# Patient Record
Sex: FEMALE | NOT HISPANIC OR LATINO | Employment: UNEMPLOYED | URBAN - METROPOLITAN AREA
[De-identification: names, ages, dates, MRNs, and addresses within clinical notes are randomized per-mention and may not be internally consistent; named-entity substitution may affect disease eponyms.]

---

## 2018-01-20 ENCOUNTER — ANESTHESIA EVENT (OUTPATIENT)
Dept: SURGERY | Facility: OTHER | Age: 33
End: 2018-01-20
Payer: MEDICAID

## 2018-01-20 ENCOUNTER — HOSPITAL ENCOUNTER (INPATIENT)
Facility: OTHER | Age: 33
LOS: 1 days | Discharge: HOME | End: 2018-01-21
Attending: EMERGENCY MEDICINE | Admitting: OBSTETRICS & GYNECOLOGY
Payer: MEDICAID

## 2018-01-20 ENCOUNTER — SURGERY (OUTPATIENT)
Age: 33
End: 2018-01-20

## 2018-01-20 ENCOUNTER — ANESTHESIA (OUTPATIENT)
Dept: SURGERY | Facility: OTHER | Age: 33
End: 2018-01-20
Payer: MEDICAID

## 2018-01-20 DIAGNOSIS — Z98.890 S/P LAPAROTOMY: ICD-10-CM

## 2018-01-20 DIAGNOSIS — O00.90 RUPTURED ECTOPIC PREGNANCY: Primary | ICD-10-CM

## 2018-01-20 LAB
ABO + RH BLD: NORMAL
ANISOCYTOSIS BLD QL SMEAR: SLIGHT
ANISOCYTOSIS BLD QL SMEAR: SLIGHT
BASOPHILS # BLD AUTO: 0 K/UL
BASOPHILS # BLD AUTO: ABNORMAL K/UL
BASOPHILS NFR BLD: 0 %
BASOPHILS NFR BLD: 0 %
BLD GP AB SCN CELLS X3 SERPL QL: NORMAL
BURR CELLS BLD QL SMEAR: ABNORMAL
DIFFERENTIAL METHOD: ABNORMAL
DIFFERENTIAL METHOD: ABNORMAL
EOSINOPHIL # BLD AUTO: 0 K/UL
EOSINOPHIL # BLD AUTO: ABNORMAL K/UL
EOSINOPHIL NFR BLD: 0 %
EOSINOPHIL NFR BLD: 0 %
ERYTHROCYTE [DISTWIDTH] IN BLOOD BY AUTOMATED COUNT: 12.5 %
ERYTHROCYTE [DISTWIDTH] IN BLOOD BY AUTOMATED COUNT: 15.7 %
HCT VFR BLD AUTO: 18 %
HCT VFR BLD AUTO: 26.8 %
HGB BLD-MCNC: 6.1 G/DL
HGB BLD-MCNC: 9.4 G/DL
LYMPHOCYTES # BLD AUTO: 1.7 K/UL
LYMPHOCYTES # BLD AUTO: ABNORMAL K/UL
LYMPHOCYTES NFR BLD: 4 %
LYMPHOCYTES NFR BLD: 7.6 %
MCH RBC QN AUTO: 29.5 PG
MCH RBC QN AUTO: 32.3 PG
MCHC RBC AUTO-ENTMCNC: 33.9 G/DL
MCHC RBC AUTO-ENTMCNC: 35.1 G/DL
MCV RBC AUTO: 84 FL
MCV RBC AUTO: 95 FL
MONOCYTES # BLD AUTO: 1.4 K/UL
MONOCYTES # BLD AUTO: ABNORMAL K/UL
MONOCYTES NFR BLD: 3 %
MONOCYTES NFR BLD: 6.4 %
NEUTROPHILS # BLD AUTO: 18.9 K/UL
NEUTROPHILS NFR BLD: 86 %
NEUTROPHILS NFR BLD: 88 %
NEUTS BAND NFR BLD MANUAL: 5 %
OVALOCYTES BLD QL SMEAR: ABNORMAL
PLATELET # BLD AUTO: 110 K/UL
PLATELET # BLD AUTO: 296 K/UL
PLATELET BLD QL SMEAR: ABNORMAL
PLATELET BLD QL SMEAR: ABNORMAL
PMV BLD AUTO: 10.1 FL
PMV BLD AUTO: 9.8 FL
POIKILOCYTOSIS BLD QL SMEAR: SLIGHT
POIKILOCYTOSIS BLD QL SMEAR: SLIGHT
POLYCHROMASIA BLD QL SMEAR: ABNORMAL
RBC # BLD AUTO: 1.89 M/UL
RBC # BLD AUTO: 3.19 M/UL
WBC # BLD AUTO: 22.07 K/UL
WBC # BLD AUTO: 29.84 K/UL

## 2018-01-20 PROCEDURE — 37000009 HC ANESTHESIA EA ADD 15 MINS: Performed by: OBSTETRICS & GYNECOLOGY

## 2018-01-20 PROCEDURE — 85025 COMPLETE CBC W/AUTO DIFF WBC: CPT | Mod: 91

## 2018-01-20 PROCEDURE — 27201423 OPTIME MED/SURG SUP & DEVICES STERILE SUPPLY: Performed by: OBSTETRICS & GYNECOLOGY

## 2018-01-20 PROCEDURE — 88302 TISSUE EXAM BY PATHOLOGIST: CPT | Mod: 26,,, | Performed by: PATHOLOGY

## 2018-01-20 PROCEDURE — 99291 CRITICAL CARE FIRST HOUR: CPT | Mod: 25

## 2018-01-20 PROCEDURE — 88305 TISSUE EXAM BY PATHOLOGIST: CPT | Mod: 26,,, | Performed by: PATHOLOGY

## 2018-01-20 PROCEDURE — 85007 BL SMEAR W/DIFF WBC COUNT: CPT

## 2018-01-20 PROCEDURE — 63600175 PHARM REV CODE 636 W HCPCS: Performed by: ANESTHESIOLOGY

## 2018-01-20 PROCEDURE — 63600175 PHARM REV CODE 636 W HCPCS: Performed by: NURSE ANESTHETIST, CERTIFIED REGISTERED

## 2018-01-20 PROCEDURE — 25000003 PHARM REV CODE 250: Performed by: NURSE ANESTHETIST, CERTIFIED REGISTERED

## 2018-01-20 PROCEDURE — 88305 TISSUE EXAM BY PATHOLOGIST: CPT | Performed by: PATHOLOGY

## 2018-01-20 PROCEDURE — 36000709 HC OR TIME LEV III EA ADD 15 MIN: Performed by: OBSTETRICS & GYNECOLOGY

## 2018-01-20 PROCEDURE — P9021 RED BLOOD CELLS UNIT: HCPCS

## 2018-01-20 PROCEDURE — 10T20ZZ RESECTION OF PRODUCTS OF CONCEPTION, ECTOPIC, OPEN APPROACH: ICD-10-PCS | Performed by: OBSTETRICS & GYNECOLOGY

## 2018-01-20 PROCEDURE — 85027 COMPLETE CBC AUTOMATED: CPT

## 2018-01-20 PROCEDURE — P9045 ALBUMIN (HUMAN), 5%, 250 ML: HCPCS | Mod: JG | Performed by: NURSE ANESTHETIST, CERTIFIED REGISTERED

## 2018-01-20 PROCEDURE — 36415 COLL VENOUS BLD VENIPUNCTURE: CPT

## 2018-01-20 PROCEDURE — 86920 COMPATIBILITY TEST SPIN: CPT

## 2018-01-20 PROCEDURE — 99285 EMERGENCY DEPT VISIT HI MDM: CPT

## 2018-01-20 PROCEDURE — 59151 TREAT ECTOPIC PREGNANCY: CPT | Mod: 80,,, | Performed by: OBSTETRICS & GYNECOLOGY

## 2018-01-20 PROCEDURE — 25000003 PHARM REV CODE 250: Performed by: OBSTETRICS & GYNECOLOGY

## 2018-01-20 PROCEDURE — 71000033 HC RECOVERY, INTIAL HOUR: Performed by: OBSTETRICS & GYNECOLOGY

## 2018-01-20 PROCEDURE — 0WJJ0ZZ INSPECTION OF PELVIC CAVITY, OPEN APPROACH: ICD-10-PCS | Performed by: OBSTETRICS & GYNECOLOGY

## 2018-01-20 PROCEDURE — 36000708 HC OR TIME LEV III 1ST 15 MIN: Performed by: OBSTETRICS & GYNECOLOGY

## 2018-01-20 PROCEDURE — 11000001 HC ACUTE MED/SURG PRIVATE ROOM

## 2018-01-20 PROCEDURE — 0UB90ZZ EXCISION OF UTERUS, OPEN APPROACH: ICD-10-PCS | Performed by: OBSTETRICS & GYNECOLOGY

## 2018-01-20 PROCEDURE — 86901 BLOOD TYPING SEROLOGIC RH(D): CPT | Mod: 91

## 2018-01-20 PROCEDURE — 0UT70ZZ RESECTION OF BILATERAL FALLOPIAN TUBES, OPEN APPROACH: ICD-10-PCS | Performed by: OBSTETRICS & GYNECOLOGY

## 2018-01-20 PROCEDURE — 63600175 PHARM REV CODE 636 W HCPCS: Performed by: OBSTETRICS & GYNECOLOGY

## 2018-01-20 PROCEDURE — 37000008 HC ANESTHESIA 1ST 15 MINUTES: Performed by: OBSTETRICS & GYNECOLOGY

## 2018-01-20 PROCEDURE — 71000039 HC RECOVERY, EACH ADD'L HOUR: Performed by: OBSTETRICS & GYNECOLOGY

## 2018-01-20 PROCEDURE — 59151 TREAT ECTOPIC PREGNANCY: CPT | Mod: ,,, | Performed by: OBSTETRICS & GYNECOLOGY

## 2018-01-20 RX ORDER — BISACODYL 10 MG
10 SUPPOSITORY, RECTAL RECTAL DAILY PRN
Status: DISCONTINUED | OUTPATIENT
Start: 2018-01-20 | End: 2018-01-21 | Stop reason: HOSPADM

## 2018-01-20 RX ORDER — ROCURONIUM BROMIDE 10 MG/ML
INJECTION, SOLUTION INTRAVENOUS
Status: DISCONTINUED | OUTPATIENT
Start: 2018-01-20 | End: 2018-01-20

## 2018-01-20 RX ORDER — MIDAZOLAM HYDROCHLORIDE 1 MG/ML
INJECTION INTRAMUSCULAR; INTRAVENOUS
Status: DISCONTINUED | OUTPATIENT
Start: 2018-01-20 | End: 2018-01-20

## 2018-01-20 RX ORDER — DIPHENHYDRAMINE HCL 25 MG
25 CAPSULE ORAL EVERY 4 HOURS PRN
Status: DISCONTINUED | OUTPATIENT
Start: 2018-01-20 | End: 2018-01-21 | Stop reason: HOSPADM

## 2018-01-20 RX ORDER — SODIUM CHLORIDE, SODIUM LACTATE, POTASSIUM CHLORIDE, CALCIUM CHLORIDE 600; 310; 30; 20 MG/100ML; MG/100ML; MG/100ML; MG/100ML
INJECTION, SOLUTION INTRAVENOUS CONTINUOUS
Status: DISCONTINUED | OUTPATIENT
Start: 2018-01-20 | End: 2018-01-21 | Stop reason: HOSPADM

## 2018-01-20 RX ORDER — DIPHENHYDRAMINE HYDROCHLORIDE 50 MG/ML
25 INJECTION INTRAMUSCULAR; INTRAVENOUS EVERY 4 HOURS PRN
Status: DISCONTINUED | OUTPATIENT
Start: 2018-01-20 | End: 2018-01-21 | Stop reason: HOSPADM

## 2018-01-20 RX ORDER — SODIUM CHLORIDE, SODIUM LACTATE, POTASSIUM CHLORIDE, CALCIUM CHLORIDE 600; 310; 30; 20 MG/100ML; MG/100ML; MG/100ML; MG/100ML
INJECTION, SOLUTION INTRAVENOUS CONTINUOUS PRN
Status: DISCONTINUED | OUTPATIENT
Start: 2018-01-20 | End: 2018-01-20

## 2018-01-20 RX ORDER — GLYCOPYRROLATE 0.2 MG/ML
INJECTION INTRAMUSCULAR; INTRAVENOUS
Status: DISCONTINUED | OUTPATIENT
Start: 2018-01-20 | End: 2018-01-20

## 2018-01-20 RX ORDER — HYDROMORPHONE HYDROCHLORIDE 2 MG/ML
0.2 INJECTION, SOLUTION INTRAMUSCULAR; INTRAVENOUS; SUBCUTANEOUS EVERY 5 MIN PRN
Status: DISCONTINUED | OUTPATIENT
Start: 2018-01-20 | End: 2018-01-20 | Stop reason: HOSPADM

## 2018-01-20 RX ORDER — HYDROCODONE BITARTRATE AND ACETAMINOPHEN 500; 5 MG/1; MG/1
TABLET ORAL
Status: DISCONTINUED | OUTPATIENT
Start: 2018-01-20 | End: 2018-01-20

## 2018-01-20 RX ORDER — ONDANSETRON HYDROCHLORIDE 2 MG/ML
INJECTION, SOLUTION INTRAMUSCULAR; INTRAVENOUS
Status: DISCONTINUED | OUTPATIENT
Start: 2018-01-20 | End: 2018-01-20

## 2018-01-20 RX ORDER — SODIUM CHLORIDE 0.9 % (FLUSH) 0.9 %
3 SYRINGE (ML) INJECTION
Status: DISCONTINUED | OUTPATIENT
Start: 2018-01-20 | End: 2018-01-21 | Stop reason: HOSPADM

## 2018-01-20 RX ORDER — BUPIVACAINE HYDROCHLORIDE 2.5 MG/ML
INJECTION, SOLUTION EPIDURAL; INFILTRATION; INTRACAUDAL
Status: DISCONTINUED | OUTPATIENT
Start: 2018-01-20 | End: 2018-01-20 | Stop reason: HOSPADM

## 2018-01-20 RX ORDER — SUCCINYLCHOLINE CHLORIDE 20 MG/ML
INJECTION INTRAMUSCULAR; INTRAVENOUS
Status: DISCONTINUED | OUTPATIENT
Start: 2018-01-20 | End: 2018-01-20

## 2018-01-20 RX ORDER — MEPERIDINE HYDROCHLORIDE 50 MG/ML
12.5 INJECTION INTRAMUSCULAR; INTRAVENOUS; SUBCUTANEOUS ONCE AS NEEDED
Status: COMPLETED | OUTPATIENT
Start: 2018-01-20 | End: 2018-01-20

## 2018-01-20 RX ORDER — ONDANSETRON 2 MG/ML
4 INJECTION INTRAMUSCULAR; INTRAVENOUS EVERY 6 HOURS PRN
Status: DISCONTINUED | OUTPATIENT
Start: 2018-01-20 | End: 2018-01-21 | Stop reason: HOSPADM

## 2018-01-20 RX ORDER — KETOROLAC TROMETHAMINE 30 MG/ML
30 INJECTION, SOLUTION INTRAMUSCULAR; INTRAVENOUS EVERY 6 HOURS
Status: COMPLETED | OUTPATIENT
Start: 2018-01-20 | End: 2018-01-21

## 2018-01-20 RX ORDER — OXYCODONE AND ACETAMINOPHEN 10; 325 MG/1; MG/1
1 TABLET ORAL EVERY 4 HOURS PRN
Status: DISCONTINUED | OUTPATIENT
Start: 2018-01-20 | End: 2018-01-21 | Stop reason: HOSPADM

## 2018-01-20 RX ORDER — PHENYLEPHRINE HYDROCHLORIDE 10 MG/ML
INJECTION INTRAVENOUS
Status: DISCONTINUED | OUTPATIENT
Start: 2018-01-20 | End: 2018-01-20

## 2018-01-20 RX ORDER — OXYCODONE AND ACETAMINOPHEN 5; 325 MG/1; MG/1
1 TABLET ORAL EVERY 4 HOURS PRN
Status: DISCONTINUED | OUTPATIENT
Start: 2018-01-20 | End: 2018-01-21 | Stop reason: HOSPADM

## 2018-01-20 RX ORDER — ALBUMIN HUMAN 50 G/1000ML
SOLUTION INTRAVENOUS CONTINUOUS PRN
Status: DISCONTINUED | OUTPATIENT
Start: 2018-01-20 | End: 2018-01-20

## 2018-01-20 RX ORDER — ONDANSETRON 8 MG/1
8 TABLET, ORALLY DISINTEGRATING ORAL EVERY 8 HOURS PRN
Status: DISCONTINUED | OUTPATIENT
Start: 2018-01-20 | End: 2018-01-21 | Stop reason: HOSPADM

## 2018-01-20 RX ORDER — SODIUM CHLORIDE 9 MG/ML
INJECTION, SOLUTION INTRAVENOUS CONTINUOUS PRN
Status: DISCONTINUED | OUTPATIENT
Start: 2018-01-20 | End: 2018-01-20

## 2018-01-20 RX ORDER — ETOMIDATE 2 MG/ML
INJECTION INTRAVENOUS
Status: DISCONTINUED | OUTPATIENT
Start: 2018-01-20 | End: 2018-01-20

## 2018-01-20 RX ORDER — OXYCODONE HYDROCHLORIDE 5 MG/1
5 TABLET ORAL
Status: DISCONTINUED | OUTPATIENT
Start: 2018-01-20 | End: 2018-01-20 | Stop reason: HOSPADM

## 2018-01-20 RX ORDER — FENTANYL CITRATE 50 UG/ML
INJECTION, SOLUTION INTRAMUSCULAR; INTRAVENOUS
Status: DISCONTINUED | OUTPATIENT
Start: 2018-01-20 | End: 2018-01-20

## 2018-01-20 RX ORDER — HYDROMORPHONE HYDROCHLORIDE 2 MG/ML
0.4 INJECTION, SOLUTION INTRAMUSCULAR; INTRAVENOUS; SUBCUTANEOUS EVERY 5 MIN PRN
Status: DISCONTINUED | OUTPATIENT
Start: 2018-01-20 | End: 2018-01-20 | Stop reason: HOSPADM

## 2018-01-20 RX ORDER — ONDANSETRON 2 MG/ML
4 INJECTION INTRAMUSCULAR; INTRAVENOUS DAILY PRN
Status: DISCONTINUED | OUTPATIENT
Start: 2018-01-20 | End: 2018-01-20 | Stop reason: HOSPADM

## 2018-01-20 RX ORDER — LIDOCAINE HCL/PF 100 MG/5ML
SYRINGE (ML) INTRAVENOUS
Status: DISCONTINUED | OUTPATIENT
Start: 2018-01-20 | End: 2018-01-20

## 2018-01-20 RX ORDER — ACETAMINOPHEN 10 MG/ML
INJECTION, SOLUTION INTRAVENOUS
Status: DISCONTINUED | OUTPATIENT
Start: 2018-01-20 | End: 2018-01-20

## 2018-01-20 RX ORDER — NEOSTIGMINE METHYLSULFATE 1 MG/ML
INJECTION, SOLUTION INTRAVENOUS
Status: DISCONTINUED | OUTPATIENT
Start: 2018-01-20 | End: 2018-01-20

## 2018-01-20 RX ORDER — CEFAZOLIN SODIUM 2 G/50ML
2 SOLUTION INTRAVENOUS
Status: DISCONTINUED | OUTPATIENT
Start: 2018-01-20 | End: 2018-01-20 | Stop reason: HOSPADM

## 2018-01-20 RX ADMIN — FENTANYL CITRATE 50 MCG: 50 INJECTION, SOLUTION INTRAMUSCULAR; INTRAVENOUS at 01:01

## 2018-01-20 RX ADMIN — ACETAMINOPHEN 1000 MG: 10 INJECTION, SOLUTION INTRAVENOUS at 12:01

## 2018-01-20 RX ADMIN — SUCCINYLCHOLINE CHLORIDE 160 MG: 20 INJECTION, SOLUTION INTRAMUSCULAR; INTRAVENOUS at 11:01

## 2018-01-20 RX ADMIN — BUPIVACAINE HYDROCHLORIDE 8 ML: 2.5 INJECTION, SOLUTION EPIDURAL; INFILTRATION; INTRACAUDAL; PERINEURAL at 11:01

## 2018-01-20 RX ADMIN — ONDANSETRON 4 MG: 2 INJECTION, SOLUTION INTRAMUSCULAR; INTRAVENOUS at 12:01

## 2018-01-20 RX ADMIN — KETOROLAC TROMETHAMINE 30 MG: 30 INJECTION, SOLUTION INTRAMUSCULAR at 05:01

## 2018-01-20 RX ADMIN — ALBUMIN (HUMAN): 2.5 SOLUTION INTRAVENOUS at 11:01

## 2018-01-20 RX ADMIN — LIDOCAINE HYDROCHLORIDE 100 MG: 20 INJECTION, SOLUTION INTRAVENOUS at 11:01

## 2018-01-20 RX ADMIN — MIDAZOLAM HYDROCHLORIDE 5 MG: 1 INJECTION, SOLUTION INTRAMUSCULAR; INTRAVENOUS at 11:01

## 2018-01-20 RX ADMIN — SODIUM CHLORIDE: 0.9 INJECTION, SOLUTION INTRAVENOUS at 11:01

## 2018-01-20 RX ADMIN — OXYCODONE HYDROCHLORIDE AND ACETAMINOPHEN 1 TABLET: 10; 325 TABLET ORAL at 07:01

## 2018-01-20 RX ADMIN — ETOMIDATE 20 MG: 2 INJECTION, SOLUTION INTRAVENOUS at 11:01

## 2018-01-20 RX ADMIN — GLYCOPYRROLATE 0.6 MG: 0.2 INJECTION, SOLUTION INTRAMUSCULAR; INTRAVENOUS at 12:01

## 2018-01-20 RX ADMIN — ROCURONIUM BROMIDE 10 MG: 10 INJECTION, SOLUTION INTRAVENOUS at 12:01

## 2018-01-20 RX ADMIN — HYDROMORPHONE HYDROCHLORIDE 0.4 MG: 2 INJECTION, SOLUTION INTRAMUSCULAR; INTRAVENOUS; SUBCUTANEOUS at 01:01

## 2018-01-20 RX ADMIN — MEPERIDINE HYDROCHLORIDE 12.5 MG: 50 INJECTION INTRAMUSCULAR; INTRAVENOUS; SUBCUTANEOUS at 02:01

## 2018-01-20 RX ADMIN — PHENYLEPHRINE HYDROCHLORIDE 100 MCG: 10 INJECTION INTRAVENOUS at 11:01

## 2018-01-20 RX ADMIN — SODIUM CHLORIDE, SODIUM LACTATE, POTASSIUM CHLORIDE, AND CALCIUM CHLORIDE: 600; 310; 30; 20 INJECTION, SOLUTION INTRAVENOUS at 11:01

## 2018-01-20 RX ADMIN — NEOSTIGMINE METHYLSULFATE 4 MG: 1 INJECTION INTRAVENOUS at 12:01

## 2018-01-20 RX ADMIN — CARBOXYMETHYLCELLULOSE SODIUM 2 DROP: 2.5 SOLUTION/ DROPS OPHTHALMIC at 11:01

## 2018-01-20 RX ADMIN — FENTANYL CITRATE 50 MCG: 50 INJECTION, SOLUTION INTRAMUSCULAR; INTRAVENOUS at 12:01

## 2018-01-20 RX ADMIN — SODIUM CHLORIDE, SODIUM LACTATE, POTASSIUM CHLORIDE, AND CALCIUM CHLORIDE: 600; 310; 30; 20 INJECTION, SOLUTION INTRAVENOUS at 05:01

## 2018-01-20 NOTE — ED PROVIDER NOTES
Encounter Date: 2018    SCRIBE #1 NOTE: Sandra HERNANDEZ, can scribing for, and in the presence of, Dr. Christensen.       History     Chief Complaint   Patient presents with    Abdominal Pain     transfer from [facility] by [place] for RLQ abd pain, , approx 8 wk, IUP per US, accepted by MD Ketty     32 y.o. pregnant female, , at approximately 8 weeks gestation who presents to ED from [facility] due to sudden onset of RLQ abdominal pain this morning. She notes accompanying N/V, but no diarrhea, urinary symptoms, chest pain, or SOB. She has had a prior . She has been transferred to this facility to be evaluated by Dr. Mcdonald, OB/GYN.      The history is provided by the patient and medical records.     Review of patient's allergies indicates:  No Known Allergies  Past Medical History:   Diagnosis Date    Arthritis     Scoliosis      Past Surgical History:   Procedure Laterality Date    TUBAL LIGATION Bilateral      History reviewed. No pertinent family history.  Social History   Substance Use Topics    Smoking status: Never Smoker    Smokeless tobacco: Never Used    Alcohol use No     Review of Systems   Constitutional: Negative for chills and fever.   HENT: Negative for congestion, rhinorrhea and sore throat.    Respiratory: Negative for cough and shortness of breath.    Cardiovascular: Negative for chest pain.   Gastrointestinal: Positive for abdominal pain, nausea and vomiting. Negative for constipation and diarrhea.   Endocrine: Negative for polyuria.   Genitourinary: Negative for decreased urine volume and dysuria.   Musculoskeletal: Negative for back pain.   Skin: Negative for rash.   Allergic/Immunologic: Negative for immunocompromised state.   Neurological: Negative for dizziness and weakness.   Hematological: Does not bruise/bleed easily.   Psychiatric/Behavioral: Negative for confusion.       Physical Exam     Initial Vitals [18 0942]   BP Pulse  Resp Temp SpO2   (!) 110/54 (!) 122 18 97.9 °F (36.6 °C) 100 %      MAP       72.67         Physical Exam    Nursing note and vitals reviewed.  Constitutional: She appears well-developed and well-nourished. She is not diaphoretic. She appears ill. No distress.   Uncomfortable appearing.   HENT:   Head: Normocephalic and atraumatic.   Right Ear: External ear normal.   Left Ear: External ear normal.   Moist mucous membranes.   Eyes: Right eye exhibits no discharge. Left eye exhibits no discharge.   No pallor or icterus.   Neck: Normal range of motion. Neck supple.   Cardiovascular: Normal rate, regular rhythm and normal heart sounds. Exam reveals no gallop and no friction rub.    No murmur heard.  Pulmonary/Chest: Breath sounds normal. No respiratory distress. She has no wheezes. She has no rhonchi. She has no rales.   Abdominal: Soft. Bowel sounds are normal. She exhibits distension. There is tenderness. There is guarding. There is no rebound.   Abdomen distended and diffusely tender with voluntary guarding, but no rebound.   Musculoskeletal: Normal range of motion. She exhibits no edema or tenderness.   Lymphadenopathy:     She has no cervical adenopathy.   Neurological: She is alert and oriented to person, place, and time. She has normal strength. No sensory deficit.   Skin: Skin is warm and dry. No rash noted. No erythema.   Psychiatric: She has a normal mood and affect. Her behavior is normal.         ED Course   Critical Care  Date/Time: 1/20/2018 1:56 PM  Performed by: DAVID NEW II  Authorized by: DAVID NEW II   Direct patient critical care time: 10 minutes  Additional history critical care time: 8 minutes  Ordering / reviewing critical care time: 8 minutes  Documentation critical care time: 10 minutes  Consulting other physicians critical care time: 10 minutes  Total critical care time (exclusive of procedural time) : 46 minutes  Critical care was necessary to treat or prevent imminent or  life-threatening deterioration of the following conditions: shock and circulatory failure.        Labs Reviewed   TYPE & SCREEN   TYPE & SCREEN   PREPARE RBC SOFT   PREPARE RBC SOFT              Imaging Results          US OB Less Than 14 Wks with Transvag(xpd (Final result)  Result time 01/20/18 13:06:33    Final result by Belle Sheikh MD (01/20/18 13:06:33)                 Impression:        Imaging findings highly concerning for a ruptured ectopic pregnancy.  This may possibly represent a cornual pregnancy.    These findings were reported to the ordering physician at approximately 1030 hrs. on 1/20/2018.      Electronically signed by: Belle Sheikh MD  Date:     01/20/18  Time:    13:06              Narrative:    Technique: OB ultrasound    Results: The uterus measures 9.5 x 5.6 x 5.6 cm.  No intrauterine pregnancy is seen.  There is an ectopic pregnancy in the right adnexal location possibly renal in location.  The crown rump length that measures 2.4 cm with a heart tone of 140 beats per minute corresponding to a 9 week 1 day gestation.  There is significant amount of complex free fluid seen throughout the abdomen and pelvis highly concerning for a ruptured ectopic pregnancy.                              Medical Decision Making:   Clinical Tests:   Lab Tests: Ordered and Reviewed  Radiological Study: Ordered  ED Management:  9:32 AM - Paged gyn.  9:41 AM - Case discussed with US tech. Have requested stat imaging.  9:56 AM - Reviewed real time US images. Suggests of ectopic pregnancy without IUD.  9:57 AM - Paged gyn again.  10:05 AM - Case discussed directly with Dr. Mcdonald. Will evaluate pt shortly. Plan to go to Or.  10:10 AM - Dr. Mcdonald and residents at bedside reviewing US. Discussing plan of care with pt.  Other:   I have discussed this case with another health care provider.            Scribe Attestation:   Scribe #1: I performed the above scribed service and the documentation accurately describes the  services I performed. I attest to the accuracy of the note.    Attending Attestation:           Physician Attestation for Scribe:  Physician Attestation Statement for Scribe #1: I, Dr. Christensen, reviewed documentation, as scribed by Sandra Simpson in my presence, and it is both accurate and complete.                 ED Course      Patient transferred from outside facility due to large amount of free fluid seen on ultrasound along with a 8-week 6 day gestational age reported IUP.  The patient has a history of tubal ligation.  On exam she is tachycardic has relative hypotension, and a distended significantly tender abdomen.  Appears generally pale and ill.  Clinical suspicion was high for ectopic pregnancy.  Therefore GYN was consulted and stat ultrasound was obtained upon arrival.  This suggests right sided ectopic pregnancy with large amount of intra-abdominal hemorrhage.  We will initiate transfusion, plan to bring patient directly to the OR for operative intervention.  Dr. Mcdonald consulted, at bedside promptly.    Clinical Impression:     1. Ruptured ectopic pregnancy                               Satish Christensen II, MD  01/20/18 [id number]

## 2018-01-20 NOTE — OPERATIVE NOTE ADDENDUM
Certification of Assistant at Surgery       Surgery Date: 1/20/2018     Participating Surgeons:  Surgeon(s) and Role:     * Max Mcdonald MD - Primary    Procedures:  Procedure(s) (LRB):  EXPLORATORY-LAPAROTOMY (N/A)  SALPINGECTOMY (Bilateral)    Assistant Surgeon's Certification of Necessity:  I understand that section 1842 (b) (6) (d) of the Social Security Act generally prohibits Medicare Part B reasonable charge payment for the services of assistants at surgery in teaching hospitals when qualified residents are available to furnish such services. I certify that the services for which payment is claimed were medically necessary, and that no qualified resident was available to perform the services. I further understand that these services are subject to post-payment review by the Medicare carrier.    No Residents were available due to protected academic time today.    Radha Altamirano MD    01/20/2018  1:42 PM

## 2018-01-20 NOTE — ANESTHESIA PREPROCEDURE EVALUATION
01/20/2018  Britta Keen is a 32 y.o., female.    Anesthesia Evaluation    I have reviewed the Patient Summary Reports.    I have reviewed the Nursing Notes.   I have reviewed the Medications.     Review of Systems  Anesthesia Hx:  Denies Family Hx of Anesthesia complications.   Denies Personal Hx of Anesthesia complications.   Social:  Non-Smoker    Hematology/Oncology:     Oncology Normal    -- Anemia:   EENT/Dental:EENT/Dental Normal   Cardiovascular:  Cardiovascular Normal  Tachycardic   Pulmonary:  Pulmonary Normal    Renal/:  Renal/ Normal     Hepatic/GI:  Hepatic/GI Normal    Musculoskeletal:   Arthritis   Spine Disorders:    OB/GYN/PEDS:  Ruptured Ectopic   Neurological:  Neurology Normal    Endocrine:  Endocrine Normal    Dermatological:  Skin Normal    Psych:  Psychiatric Normal           Physical Exam  General:  Well nourished    Airway/Jaw/Neck:  Airway Findings: Mouth Opening: Normal Tongue: Normal  General Airway Assessment: Adult  Mallampati: II  TM Distance: Normal, at least 6 cm      Dental:  Dental Findings: In tact   Chest/Lungs:  Chest/Lungs Findings: Clear to auscultation     Heart/Vascular:  Heart Findings: Rate: Tachycardia  Rhythm: Regular Rhythm     Abdomen:  Abdomen Findings:  Distention, Tenderness       Mental Status:  Mental Status Findings:  Lethargic         Anesthesia Plan  Type of Anesthesia, risks & benefits discussed:  Anesthesia Type:  general  Patient's Preference:   Intra-op Monitoring Plan: standard ASA monitors  Intra-op Monitoring Plan Comments:   Post Op Pain Control Plan: multimodal analgesia  Post Op Pain Control Plan Comments:   Induction:   IV and rapid sequence  Beta Blocker:         Informed Consent: Patient understands risks and agrees with Anesthesia plan.  Questions answered. Anesthesia consent signed with patient.  ASA Score: 2  emergent   Day of  Surgery Review of History & Physical:    H&P update referred to the surgeon.     Anesthesia Plan Notes: Multiple IV's, rapid infuser        Ready For Surgery From Anesthesia Perspective.

## 2018-01-20 NOTE — ED NOTES
Vaginal ultrasound in process at bedside, pelvic set up done. Dr. Christensen at bedside to evaluate the patient. Type and cross and CBC sent to lab and blood bank. Will continue to monitor.

## 2018-01-20 NOTE — NURSING
Pt. received from HALEY Michelle. Settled pt into room 376. VSS. Family at the bedside. ABD dressing CDI. Will continue to monitor.

## 2018-01-20 NOTE — H&P
[facility]  Obstetrics  History & Physical    Patient Name: Britta Keen  MRN: [id number]  Admission Date: 2018  Primary Care Provider: Primary Doctor No    Subjective:     Principal Problem:Ruptured ectopic pregnancy    History of Present Illness: Pt is 32 y.o. with hx of BTL in  who presents to outside ED with c/o abd pain.  bhcg 88k.  Abd distended.    U/s at [facility], [place], operated by [facility] -- confirms ectopic pregnancy, + FHT.  Large amount of blood (up to liver).        Obstetric History       T0      L2     SAB0   TAB0   Ectopic0   Multiple0   Live Births0       # Outcome Date GA Lbr Ck/2nd Weight Sex Delivery Anes PTL Lv   3             2 Para            1 Para                 Past Medical History:   Diagnosis Date    Arthritis     Scoliosis      Past Surgical History:   Procedure Laterality Date    TUBAL LIGATION Bilateral          (Not in a hospital admission)    Review of patient's allergies indicates:  No Known Allergies     Family History     None        Social History Main Topics    Smoking status: Never Smoker    Smokeless tobacco: Never Used    Alcohol use No    Drug use: No    Sexual activity: Not on file     Review of Systems   Objective:     Vital Signs (Most Recent):  Temp: 97.9 °F (36.6 °C) (18 0942)  Pulse: (!) 126 (18 1006)  Resp: 20 (18 0954)  BP: (!) 92/55 (18 1006)  SpO2: 98 % (18 1006) Vital Signs (24h Range):  Temp:  [97.7 °F (36.5 °C)-98.2 °F (36.8 °C)] 97.9 °F (36.6 °C)  Pulse:  [] 126  Resp:  [18-20] 20  SpO2:  [96 %-100 %] 98 %  BP: ()/(46-73) 92/55     Weight: 74.4 kg (164 lb)  Body mass index is 24.22 kg/m².      Physical Exam:   Constitutional: She is oriented to person, place, and time. She appears well-developed. She appears distressed.    HENT:   Head: Normocephalic and atraumatic.    Eyes: Conjunctivae and lids are normal. Pupils are equal, round, and reactive to light.    Neck: Normal range of  motion and full passive range of motion without pain. Neck supple.    Cardiovascular: Normal rate and regular rhythm.  Exam reveals no edema.     Pulmonary/Chest: Effort normal and breath sounds normal. She has no wheezes.        Abdominal: Normal appearance and bowel sounds are normal. She exhibits distension. There is tenderness. There is no rebound and no guarding.             Musculoskeletal: Normal range of motion and moves all extremeties.       Neurological: She is alert and oriented to person, place, and time. She has normal strength.    Skin: Skin is warm. She is diaphoretic.    Psychiatric: She has a normal mood and affect. Her speech is normal and behavior is normal. Thought content normal. Her mood appears not anxious. She does not exhibit a depressed mood. She expresses no suicidal plans and no homicidal plans.     Significant Labs:  Lab Results   Component Value Date    GROUPTRH A POS 2018       I have personallly reviewed all pertinent lab results from the last 24 hours.    Assessment/Plan:     32 y.o. female  at Unknown for:    Active Diagnoses:    Diagnosis Date Noted POA    PRINCIPAL PROBLEM:  Ruptured ectopic pregnancy [O00.90] 2018 Yes      Problems Resolved During this Admission:    Diagnosis Date Noted Date Resolved POA       Ruptured ectopic pregnancy-    Plan  Agree with OR management.  Anesthesia notified.  Will proceed with mini-lap.  R/b/i/a discussed with pt.  All questions answered.      Max Mcdonald MD  Obstetrics  [facility]

## 2018-01-20 NOTE — ANESTHESIA POSTPROCEDURE EVALUATION
"Anesthesia Post Evaluation    Patient: Britta Keen    Procedure(s) Performed: Procedure(s) (LRB):  EXPLORATORY-LAPAROTOMY (N/A)  SALPINGECTOMY (Bilateral)    Final Anesthesia Type: general  Patient location during evaluation: PACU  Patient participation: Yes- Able to Participate  Level of consciousness: awake and alert  Post-procedure vital signs: reviewed and stable  Pain management: adequate  Airway patency: patent  PONV status at discharge: No PONV  Anesthetic complications: no      Cardiovascular status: blood pressure returned to baseline  Respiratory status: unassisted and spontaneous ventilation  Hydration status: euvolemic  Follow-up not needed.        Visit Vitals  /78   Pulse 109   Temp 36.8 °C (98.2 °F) (Oral)   Resp 18   Ht 5' 9" (1.753 m)   Wt 74.4 kg (164 lb)   SpO2 100%   BMI 24.22 kg/m²       Pain/Emmanuel Score: Pain Assessment Performed: Yes (1/20/2018  1:45 PM)  Presence of Pain: complains of pain/discomfort (1/20/2018  1:45 PM)  Pain Rating Prior to Med Admin: 10 (1/20/2018  2:38 AM)  Pain Rating Post Med Admin: 4 (1/20/2018  1:45 PM)  Emmanuel Score: 8 (1/20/2018  1:45 PM)      "

## 2018-01-20 NOTE — TRANSFER OF CARE
"Anesthesia Transfer of Care Note    Patient: Britta Keen    Procedure(s) Performed: Procedure(s) (LRB):  EXPLORATORY-LAPAROTOMY (N/A)  SALPINGECTOMY (Bilateral)    Patient location: PACU    Anesthesia Type: general    Transport from OR: Transported from OR on room air with adequate spontaneous ventilation    Post pain: adequate analgesia    Post assessment: no apparent anesthetic complications    Post vital signs: stable    Level of consciousness: awake and alert    Nausea/Vomiting: no nausea/vomiting    Complications: none    Transfer of care protocol was followed      Last vitals:   Visit Vitals  /61   Pulse (!) 132   Temp 36.8 °C (98.2 °F) (Oral)   Resp 20   Ht 5' 9" (1.753 m)   Wt 74.4 kg (164 lb)   SpO2 100%   BMI 24.22 kg/m²     "

## 2018-01-20 NOTE — PROGRESS NOTES
"Staff:  S:  Pt resting.  A&O x 3.  Pain controlled.  /74   Pulse 88   Temp 98.3 °F (36.8 °C)   Resp 18   Ht 5' 9" (1.753 m)   Wt 74.4 kg (164 lb)   SpO2 99%   BMI 24.22 kg/m²   Exam:  Abd:  Soft, non-distended.  Labs pending  Assessment:  Ruptured right cornual ectopic pregnancy with associated acute blood loss  Plan  1)  Heme -- await post-transfusion H&H.  HD stable with good urine output  2)  Ruptured cornual ectopic pregnancy -- aware of high bhcg and need to follow down to < 1.2 (sukumar since we never saw a clear embryo sac at the time of surgery like they saw on u/s.  It was most likely caught up in the clots and debris removed).    "

## 2018-01-21 VITALS
BODY MASS INDEX: 24.29 KG/M2 | TEMPERATURE: 99 F | SYSTOLIC BLOOD PRESSURE: 150 MMHG | RESPIRATION RATE: 18 BRPM | DIASTOLIC BLOOD PRESSURE: 69 MMHG | HEART RATE: 108 BPM | WEIGHT: 164 LBS | OXYGEN SATURATION: 98 % | HEIGHT: 69 IN

## 2018-01-21 PROBLEM — Z98.890 S/P LAPAROTOMY: Status: ACTIVE | Noted: 2018-01-21

## 2018-01-21 PROCEDURE — 63600175 PHARM REV CODE 636 W HCPCS: Performed by: OBSTETRICS & GYNECOLOGY

## 2018-01-21 PROCEDURE — 99024 POSTOP FOLLOW-UP VISIT: CPT | Mod: ,,, | Performed by: OBSTETRICS & GYNECOLOGY

## 2018-01-21 PROCEDURE — 94761 N-INVAS EAR/PLS OXIMETRY MLT: CPT

## 2018-01-21 PROCEDURE — 25000003 PHARM REV CODE 250: Performed by: OBSTETRICS & GYNECOLOGY

## 2018-01-21 PROCEDURE — 25000003 PHARM REV CODE 250: Performed by: STUDENT IN AN ORGANIZED HEALTH CARE EDUCATION/TRAINING PROGRAM

## 2018-01-21 RX ORDER — IBUPROFEN 600 MG/1
600 TABLET ORAL EVERY 6 HOURS PRN
Qty: 30 TABLET | Refills: 1 | Status: SHIPPED | OUTPATIENT
Start: 2018-01-21 | End: 2018-03-05

## 2018-01-21 RX ORDER — OXYCODONE AND ACETAMINOPHEN 5; 325 MG/1; MG/1
1 TABLET ORAL EVERY 4 HOURS PRN
Qty: 30 TABLET | Refills: 0 | Status: SHIPPED | OUTPATIENT
Start: 2018-01-21 | End: 2018-01-21

## 2018-01-21 RX ORDER — SIMETHICONE 80 MG
80 TABLET,CHEWABLE ORAL 3 TIMES DAILY PRN
Refills: 0 | COMMUNITY
Start: 2018-01-21 | End: 2018-03-05

## 2018-01-21 RX ORDER — SIMETHICONE 80 MG
80 TABLET,CHEWABLE ORAL 3 TIMES DAILY PRN
Refills: 0 | COMMUNITY
Start: 2018-01-21 | End: 2018-01-21

## 2018-01-21 RX ORDER — IBUPROFEN 600 MG/1
600 TABLET ORAL EVERY 6 HOURS PRN
Qty: 30 TABLET | Refills: 1 | Status: SHIPPED | OUTPATIENT
Start: 2018-01-21 | End: 2018-01-21

## 2018-01-21 RX ORDER — ONDANSETRON 8 MG/1
8 TABLET, ORALLY DISINTEGRATING ORAL EVERY 8 HOURS PRN
Qty: 20 TABLET | Refills: 0 | Status: SHIPPED | OUTPATIENT
Start: 2018-01-21 | End: 2018-03-05

## 2018-01-21 RX ORDER — ONDANSETRON 8 MG/1
8 TABLET, ORALLY DISINTEGRATING ORAL EVERY 8 HOURS PRN
Qty: 20 TABLET | Refills: 0 | Status: SHIPPED | OUTPATIENT
Start: 2018-01-21 | End: 2018-01-21

## 2018-01-21 RX ORDER — OXYCODONE AND ACETAMINOPHEN 5; 325 MG/1; MG/1
1 TABLET ORAL EVERY 4 HOURS PRN
Qty: 30 TABLET | Refills: 0 | Status: SHIPPED | OUTPATIENT
Start: 2018-01-21 | End: 2018-03-05

## 2018-01-21 RX ORDER — SIMETHICONE 80 MG
1 TABLET,CHEWABLE ORAL 3 TIMES DAILY PRN
Status: DISCONTINUED | OUTPATIENT
Start: 2018-01-21 | End: 2018-01-21 | Stop reason: HOSPADM

## 2018-01-21 RX ADMIN — SODIUM CHLORIDE, SODIUM LACTATE, POTASSIUM CHLORIDE, AND CALCIUM CHLORIDE: 600; 310; 30; 20 INJECTION, SOLUTION INTRAVENOUS at 12:01

## 2018-01-21 RX ADMIN — OXYCODONE HYDROCHLORIDE AND ACETAMINOPHEN 1 TABLET: 10; 325 TABLET ORAL at 04:01

## 2018-01-21 RX ADMIN — KETOROLAC TROMETHAMINE 30 MG: 30 INJECTION, SOLUTION INTRAMUSCULAR at 12:01

## 2018-01-21 RX ADMIN — SODIUM CHLORIDE, SODIUM LACTATE, POTASSIUM CHLORIDE, AND CALCIUM CHLORIDE: 600; 310; 30; 20 INJECTION, SOLUTION INTRAVENOUS at 06:01

## 2018-01-21 RX ADMIN — SIMETHICONE CHEW TAB 80 MG 80 MG: 80 TABLET ORAL at 04:01

## 2018-01-21 RX ADMIN — KETOROLAC TROMETHAMINE 30 MG: 30 INJECTION, SOLUTION INTRAMUSCULAR at 06:01

## 2018-01-21 NOTE — PLAN OF CARE
Problem: Patient Care Overview  Goal: Plan of Care Review  Outcome: Ongoing (interventions implemented as appropriate)  Patient remains free from injury or falls. Vital signs stable throughout night on room air. Positions self independently. Pain managed with IV and PO medications. Olmedo to gravity with good output. Abd dressing CDI. Pt states feeling much better. No c/o nausea. Slept well. Spouse at bedside. Bed in low locked position and call light within reach. Will continue to monitor.

## 2018-01-21 NOTE — PLAN OF CARE
9:25 AM  Blood culture   Gram + cocci in clusters   called by Carina Interiano in microlab    9:31 AM  Called office notified per .    9:34 AM  Notified Dr Gallardo     9:38 AM  No New Orders RCVD as 1 Bottle +.

## 2018-01-21 NOTE — HOSPITAL COURSE
Patient received 2u pRBCs perioperatively. Postoperative course was uncomplicated, and patient made routine advances as anticipated. On POD#1, patient is meeting all postoperative milestones and is ready for discharge. Pain is well-controlled with PO pain medications. Patient is tolerating PO without nausea or vomiting, ambulating and urinating without difficulty. Patient instructed to continue pain medications, zofran, and simethicone as needed, and to follow up with Dr. Mcdonald in 4-6 weeks for postoperative visit. Also discussed [facility] followup until negative.

## 2018-01-21 NOTE — DISCHARGE INSTRUCTIONS
Incision Care  Remember: Follow-up visits allow your healthcare provider to make sure your incision is healing well. Be sure to keep your appointments.     Stitches (sutures), surgical staples, special strips of surgical tape, or surgical skin glue may be used to close incisions. They also help stop bleeding and speed healing. To help your incision heal, follow the tips on this handout.  Home care  Tips for home care include the following:  · Always wash your hands before touching your incision.  · Keep your incision clean and dry.  · Avoid doing things that could cause dirt or sweat to get on your incision.  · Dont pick at scabs. They help protect the wound.  · Keep your incision out of water.  · Take a sponge bath to avoid getting your incision wet, unless your healthcare provider tells you otherwise.  · Ask your provider when can you take a shower or bathe.  · Ask your provider about the best way to keep your incision dry when bathing or showering.  · Pat stitches dry if they get wet. Dont rub.  · Leave the bandage (dressing) in place until you shower.   · You may leave it open to air after.  · No Lotions, Powders, or Oils.   Care for specific closures  Follow these guidelines unless your healthcare provider tells you otherwise:  · Stitches or staples. Once you no longer need to keep these dry, clean the wound daily. First remove the bandage using clean hands. Then wash the area gently with soap and warm water. Use a wet cotton swab to loosen and remove any blood or crust that forms. After cleaning, put a thin layer of antibiotic ointment on. Then put on a new bandage.  · Skin glue. Dont put liquid, ointment, or cream on your wound while the glue is in place. Avoid activities that cause heavy sweating. Protect the wound from sunlight. Do not scratch, rub, or pick at the glue. Do not put tape directly over the glue. The glue should peel off within 5 to 10 days.  · Surgical tape. Keep the area dry. If it  gets wet, blot the area dry with a clean towel. Surgical tape usually falls off within 7 to 10 days. If it has not fallen off after 10 days, contact your healthcare provider before taking it off yourself. If you are told to remove the tape, put mineral oil or petroleum jelly on a cotton ball. Gently rub the tape until it is removed.  Changing your dressing  Leave the dressing (bandage) in place until you are told to remove it or change it. Follow the instructions below unless told otherwise by your healthcare provider:  · Always wash your hands before changing your dressing.  · After the first 48 hours, the incision wound usually will have closed. At this point, leave the incision uncovered and open to the air. If the incision has not closed keep it covered.  · Cover your incision only if your clothing is rubbing it or causing irritation.  · Change your dressing if it gets wet or soiled.  Follow-up care  Follow up with your healthcare provider to ask how long sutures or staples should be left in place. Be sure to return for stitch or staple removal as directed. If dissolving stitches were used in your mouth, these will not need to be removed. They should fall out or dissolve on their own.  If tape closures were used, remove them yourself when your provider recommends if they have not fallen off on their own. If skin glue was used, the glue will wear off by itself.  When to seek medical care  Call your healthcare provider if you have any of the following:  · More pain, redness, swelling, bleeding, or foul-smelling discharge around the incision area  · Fever of 100.4°F (38ºC) or higher, or as directed by your healthcare provider  · Shaking chills  · Vomiting or nausea that doesn't go away  · Numbness, coldness, or tingling around the incision area, or changes in skin color  · Opening of the sutures or wound  · Stitches or staples come apart or fall out or surgical tape falls off before 7 days or as directed by your  healthcare provider   Date Last Reviewed: 12/1/2016 © 2000-2017 [employer]. [address] [place] [address], [place] [address]. All rights reserved. This information is not intended as a substitute for professional medical care. Always follow your healthcare professional's instructions.            Ibuprofen tablets and capsules  What is this medicine?  IBUPROFEN (eye BYOO proe fen) is a non-steroidal anti-inflammatory drug (NSAID). It is used for dental pain, fever, headaches or migraines, osteoarthritis, rheumatoid arthritis, or painful monthly periods. It can also relieve minor aches and pains caused by a cold, flu, or sore throat.  How should I use this medicine?  Take this medicine by mouth with a glass of water. Follow the directions on the prescription label. Take this medicine with food if your stomach gets upset. Try to not lie down for at least 10 minutes after you take the medicine. Take your medicine at regular intervals. Do not take your medicine more often than directed.  A special MedGuide will be given to you by the pharmacist with each prescription and refill. Be sure to read this information carefully each time.  Talk to your pediatrician regarding the use of this medicine in children. Special care may be needed.  What side effects may I notice from receiving this medicine?  Side effects that you should report to your doctor or health care professional as soon as possible:  · allergic reactions like skin rash, itching or hives, swelling of the face, lips, or tongue  · severe stomach pain  · signs and symptoms of bleeding such as bloody or black, tarry stools; red or dark-brown urine; spitting up blood or brown material that looks like coffee grounds; red spots on the skin; unusual bruising or bleeding from the eye, gums, or nose  · signs and symptoms of a blood clot such as changes in vision; chest pain; severe, sudden headache; trouble speaking; sudden numbness or weakness of the face, arm,  or leg  · unexplained weight gain or swelling  · unusually weak or tired  · yellowing of eyes or skin  Side effects that usually do not require medical attention (report to your doctor or health care professional if they continue or are bothersome):  · bruising  · diarrhea  · dizziness, drowsiness  · headache  · nausea, vomiting  What may interact with this medicine?  Do not take this medicine with any of the following medications:  · cidofovir  · ketorolac  · methotrexate  · pemetrexed  This medicine may also interact with the following medications:  · alcohol  · aspirin  · diuretics  · lithium  · other drugs for inflammation like prednisone  · warfarin  What if I miss a dose?  If you miss a dose, take it as soon as you can. If it is almost time for your next dose, take only that dose. Do not take double or extra doses.  Where should I keep my medicine?  Keep out of the reach of children.  Store at room temperature between 15 and 30 degrees C (59 and 86 degrees F). Keep container tightly closed. Throw away any unused medicine after the expiration date.  What should I tell my health care provider before I take this medicine?  They need to know if you have any of these conditions:  · asthma  · cigarette smoker  · drink more than 3 alcohol containing drinks a day  · heart disease or circulation problems such as heart failure or leg edema (fluid retention)  · high blood pressure  · kidney disease  · liver disease  · stomach bleeding or ulcers  · an unusual or allergic reaction to ibuprofen, aspirin, other NSAIDS, other medicines, foods, dyes, or preservatives  · pregnant or trying to get pregnant  · breast-feeding  What should I watch for while using this medicine?  Tell your doctor or healthcare professional if your symptoms do not start to get better or if they get worse.  This medicine does not prevent heart attack or stroke. In fact, this medicine may increase the chance of a heart attack or stroke. The chance may  increase with longer use of this medicine and in people who have heart disease. If you take aspirin to prevent heart attack or stroke, talk with your doctor or health care professional.  Do not take other medicines that contain aspirin, ibuprofen, or naproxen with this medicine. Side effects such as stomach upset, nausea, or ulcers may be more likely to occur. Many medicines available without a prescription should not be taken with this medicine.  This medicine can cause ulcers and bleeding in the stomach and intestines at any time during treatment. Ulcers and bleeding can happen without warning symptoms and can cause death. To reduce your risk, do not smoke cigarettes or drink alcohol while you are taking this medicine.  You may get drowsy or dizzy. Do not drive, use machinery, or do anything that needs mental alertness until you know how this medicine affects you. Do not stand or sit up quickly, especially if you are an older patient. This reduces the risk of dizzy or fainting spells.  This medicine can cause you to bleed more easily. Try to avoid damage to your teeth and gums when you brush or floss your teeth.  This medicine may be used to treat migraines. If you take migraine medicines for 10 or more days a month, your migraines may get worse. Keep a diary of headache days and medicine use. Contact your healthcare professional if your migraine attacks occur more frequently.  NOTE:This sheet is a summary. It may not cover all possible information. If you have questions about this medicine, talk to your doctor, pharmacist, or health care provider. Copyright© 2017 [employer]      Ondansetron tablets  What is this medicine?  ONDANSETRON (on DAN se gilbert) is used to treat nausea and vomiting caused by chemotherapy. It is also used to prevent or treat nausea and vomiting after surgery.  How should I use this medicine?  Take this medicine by mouth with a glass of water. Follow the directions on your prescription  label. Take your doses at regular intervals. Do not take your medicine more often than directed.  Talk to your pediatrician regarding the use of this medicine in children. Special care may be needed.  What side effects may I notice from receiving this medicine?  Side effects that you should report to your doctor or health care professional as soon as possible:  · allergic reactions like skin rash, itching or hives, swelling of the face, lips or tongue  · breathing problems  · confusion  · dizziness  · fast or irregular heartbeat  · feeling faint or lightheaded, falls  · fever and chills  · loss of balance or coordination  · seizures  · sweating  · swelling of the hands or feet  · tightness in the chest  · tremors  · unusually weak or tired  Side effects that usually do not require medical attention (report to your doctor or health care professional if they continue or are bothersome):  · constipation or diarrhea  · headache  What may interact with this medicine?  Do not take this medicine with any of the following medications:  · apomorphine  · certain medicines for fungal infections like fluconazole, itraconazole, ketoconazole, posaconazole, voriconazole  · cisapride  · dofetilide  · dronedarone  · pimozide  · thioridazine  · ziprasidone  This medicine may also interact with the following medications:  · carbamazepine  · certain medicines for depression, anxiety, or psychotic disturbances  · fentanyl  · linezolid  · MAOIs like Carbex, Eldepryl, Marplan, Nardil, and Parnate  · methylene blue (injected into a vein)  · other medicines that prolong the QT interval (cause an abnormal heart rhythm)  · phenytoin  · rifampicin  · tramadol  What if I miss a dose?  If you miss a dose, take it as soon as you can. If it is almost time for your next dose, take only that dose. Do not take double or extra doses.  Where should I keep my medicine?  Keep out of the reach of children.  Store between 2 and 30 degrees C (36 and 86  degrees F). Throw away any unused medicine after the expiration date.  What should I tell my health care provider before I take this medicine?  They need to know if you have any of these conditions:  · heart disease  · history of irregular heartbeat  · liver disease  · low levels of magnesium or potassium in the blood  · an unusual or allergic reaction to ondansetron, granisetron, other medicines, foods, dyes, or preservatives  · pregnant or trying to get pregnant  · breast-feeding  What should I watch for while using this medicine?  Check with your doctor or health care professional right away if you have any sign of an allergic reaction.  NOTE:This sheet is a summary. It may not cover all possible information. If you have questions about this medicine, talk to your doctor, pharmacist, or health care provider. Copyright© 2017 [employer]        Acetaminophen; Oxycodone tablets  What is this medicine?  ACETAMINOPHEN; OXYCODONE (a set a EDIN aureliano fen; ox i KOE done) is a pain reliever. It is used to treat moderate to severe pain.  How should I use this medicine?  Take this medicine by mouth with a full glass of water. Follow the directions on the prescription label. You can take it with or without food. If it upsets your stomach, take it with food. Take your medicine at regular intervals. Do not take it more often than directed.  A special MedGuide will be given to you by the pharmacist with each prescription and refill. Be sure to read this information carefully each time.  Talk to your pediatrician regarding the use of this medicine in children. Special care may be needed.  What side effects may I notice from receiving this medicine?  Side effects that you should report to your doctor or health care professional as soon as possible:  · allergic reactions like skin rash, itching or hives, swelling of the face, lips, or tongue  · breathing problems  · confusion  · redness, blistering, peeling or loosening of the  skin, including inside the mouth  · signs and symptoms of liver injury like dark yellow or brown urine; general ill feeling or flu-like symptoms; light-colored stools; loss of appetite; nausea; right upper belly pain; unusually weak or tired; yellowing of the eyes or skin  · signs and symptoms of low blood pressure like dizziness; feeling faint or lightheaded, falls; unusually weak or tired  · trouble passing urine or change in the amount of urine  Side effects that usually do not require medical attention (report to your doctor or health care professional if they continue or are bothersome):  · constipation  · dry mouth  · nausea, vomiting  · tiredness  What may interact with this medicine?  This medicine may interact with the following medications:  · alcohol  · antihistamines for allergy, cough and cold  · antiviral medicines for HIV or AIDS  · atropine  · certain antibiotics like clarithromycin, erythromycin, linezolid, rifampin  · certain medicines for anxiety or sleep  · certain medicines for bladder problems like oxybutynin, tolterodine  · certain medicines for depression like amitriptyline, fluoxetine, sertraline  · certain medicines for fungal infections like ketoconazole, itraconazole, voriconazole  · certain medicines for migraine headache like almotriptan, eletriptan, frovatriptan, naratriptan, rizatriptan, sumatriptan, zolmitriptan  · certain medicines for nausea or vomiting like dolasetron, ondansetron, palonosetron  · certain medicines for Parkinson's disease like benztropine, trihexyphenidyl  · certain medicines for seizures like phenobarbital, phenytoin, primidone  · certain medicines for stomach problems like dicyclomine, hyoscyamine  · certain medicines for travel sickness like scopolamine  · diuretics  · general anesthetics like halothane, isoflurane, methoxyflurane, propofol  · ipratropium  · local anesthetics like lidocaine, pramoxine, tetracaine  · MAOIs like Carbex, Eldepryl, Marplan, Nardil,  and Parnate  · medicines that relax muscles for surgery  · methylene blue  · nilotinib  · other medicines with acetaminophen  · other narcotic medicines for pain or cough  · phenothiazines like chlorpromazine, mesoridazine, prochlorperazine, thioridazine  What if I miss a dose?  If you miss a dose, take it as soon as you can. If it is almost time for your next dose, take only that dose. Do not take double or extra doses.  Where should I keep my medicine?  Keep out of the reach of children. This medicine can be abused. Keep your medicine in a safe place to protect it from theft. Do not share this medicine with anyone. Selling or giving away this medicine is dangerous and against the law.  This medicine may cause accidental overdose and death if it taken by other adults, children, or pets. Mix any unused medicine with a substance like cat litter or coffee grounds. Then throw the medicine away in a sealed container like a sealed bag or a coffee can with a lid. Do not use the medicine after the expiration date.  Store at room temperature between 20 and 25 degrees C (68 and 77 degrees F).  What should I tell my health care provider before I take this medicine?  They need to know if you have any of these conditions:  · brain tumor  · Crohn's disease, inflammatory bowel disease, or ulcerative colitis  · drug abuse or addiction  · head injury  · heart or circulation problems  · if you often drink alcohol  · kidney disease or problems going to the bathroom  · liver disease  · lung disease, asthma, or breathing problems  · an unusual or allergic reaction to acetaminophen, oxycodone, other opioid analgesics, other medicines, foods, dyes, or preservatives  · pregnant or trying to get pregnant  · breast-feeding  What should I watch for while using this medicine?  Tell your doctor or health care professional if your pain does not go away, if it gets worse, or if you have new or a different type of pain. You may develop tolerance  to the medicine. Tolerance means that you will need a higher dose of the medication for pain relief. Tolerance is normal and is expected if you take this medicine for a long time.  Do not suddenly stop taking your medicine because you may develop a severe reaction. Your body becomes used to the medicine. This does NOT mean you are addicted. Addiction is a behavior related to getting and using a drug for a non-medical reason. If you have pain, you have a medical reason to take pain medicine. Your doctor will tell you how much medicine to take. If your doctor wants you to stop the medicine, the dose will be slowly lowered over time to avoid any side effects.  There are different types of narcotic medicines (opiates). If you take more than one type at the same time or if you are taking another medicine that also causes drowsiness, you may have more side effects. Give your health care provider a list of all medicines you use. Your doctor will tell you how much medicine to take. Do not take more medicine than directed. Call emergency for help if you have problems breathing or unusual sleepiness.  Do not take other medicines that contain acetaminophen with this medicine. Always read labels carefully. If you have questions, ask your doctor or pharmacist.  If you take too much acetaminophen get medical help right away. Too much acetaminophen can be very dangerous and cause liver damage. Even if you do not have symptoms, it is important to get help right away.  You may get drowsy or dizzy. Do not drive, use machinery, or do anything that needs mental alertness until you know how this medicine affects you. Do not stand or sit up quickly, especially if you are an older patient. This reduces the risk of dizzy or fainting spells. Alcohol may interfere with the effect of this medicine. Avoid alcoholic drinks.  The medicine will cause constipation. Try to have a bowel movement at least every 2 to 3 days. If you do not have a bowel  movement for 3 days, call your doctor or health care professional.  Your mouth may get dry. Chewing sugarless gum or sucking hard candy, and drinking plenty or water may help. Contact your doctor if the problem does not go away or is severe.  NOTE:This sheet is a summary. It may not cover all possible information. If you have questions about this medicine, talk to your doctor, pharmacist, or health care provider. Copyright© 2017 [employer]      Simethicone chewable tablets  What is this medicine?  SIMETHICONE (sye METH i sarbjit) is used to decrease the discomfort caused by gas.  How should I use this medicine?  Take this medicine by mouth. Crush or chew the tablets. Do not swallow them whole. Follow the directions on the label or those given to you by your doctor or health care professional. Do not take your medicine more often than directed.  Talk to your pediatrician regarding the use of this medicine in children. While this medicine may be used in children as young as 12 years for selected conditions, precautions do apply.  What side effects may I notice from receiving this medicine?  There are no reported side effects of this medicine.  What may interact with this medicine?  Interactions are not expected.  What if I miss a dose?  This does not apply. You will only use this medicine as needed for gas pain. Do not use double or extra doses.  Where should I keep my medicine?  Keep out of the reach of children.  Store at room temperature between 15 and 30 degrees C (59 and 86 degrees F). Keep container tightly closed. Throw away any unused medicine after the expiration date.  What should I tell my health care provider before I take this medicine?  They need to know if you have any of these conditions:  · phenylketonuria  · an unusual or allergic reaction to simethicone, other medicines, foods, dyes, or preservatives  · pregnant or trying to get pregnant  · breast-feeding  What should I watch for while using this  medicine?  Tell your doctor or health care professional if your symptoms get worse, or if you have severe pain, diarrhea, constipation, or blood in your stool. These could be signs of a more serious condition.  NOTE:This sheet is a summary. It may not cover all possible information. If you have questions about this medicine, talk to your doctor, pharmacist, or health care provider. Copyright© 2017 [employer]

## 2018-01-21 NOTE — SUBJECTIVE & OBJECTIVE
Interval History: POD#1. No acute issues. Pain is adequately controlled with PO medications and NSAIDs. Tolerating regular diet without nausea or vomiting. Has ambulated and voided. Denies flatus or BM. No other complaints at this time.      Scheduled Meds:  Continuous Infusions:   lactated ringers 125 mL/hr at 01/21/18 0632     PRN Meds:bisacodyl, diphenhydrAMINE, diphenhydrAMINE, ondansetron, ondansetron, oxyCODONE-acetaminophen, oxyCODONE-acetaminophen, promethazine (PHENERGAN) IVPB, simethicone, sodium chloride 0.9%    Review of patient's allergies indicates:  No Known Allergies    Objective:     Vital Signs (Most Recent):  Temp: 99.3 °F (37.4 °C) (01/21/18 1618)  Pulse: 108 (01/21/18 1618)  Resp: 18 (01/21/18 1618)  BP: (!) 150/69 (01/21/18 1618)  SpO2: 98 % (01/21/18 1618) Vital Signs (24h Range):  Temp:  [98.3 °F (36.8 °C)-99.4 °F (37.4 °C)] 99.3 °F (37.4 °C)  Pulse:  [] 108  Resp:  [16-18] 18  SpO2:  [98 %-100 %] 98 %  BP: (114-150)/(55-71) 150/69     Weight: 74.4 kg (164 lb)  Body mass index is 24.22 kg/m².  No LMP recorded (lmp unknown). Patient is not currently having periods (Reason: Other).    I&O (Last 24H):  Intake/Output - Last 3 Shifts       01/19 0700 - 01/20 0659 01/20 0700 - 01/21 0659 01/21 0700 - 01/22 0659    P.O.  600 480    I.V. (mL/kg)  3360.4 (45.2) 781.3 (10.5)    Blood  1020.8     Other   0    Total Intake(mL/kg)  4981.3 (67) 1261.3 (17)    Urine (mL/kg/hr)  3650 900 (1.3)    Blood  1700     Total Output   5350 900    Net   -368.8 +361.3               Laboratory:    Recent Labs  Lab 01/20/18  0216 01/20/18  0959 01/20/18  1734   WBC 17.70* 29.84* 22.07*   HGB 11.1* 6.1* 9.4*   HCT 33.7* 18.0* 26.8*   MCV 95 95 84    296 110*      Physical Exam:   Constitutional: She is oriented to person, place, and time. She appears well-developed and well-nourished. No distress.       Cardiovascular: Normal rate and regular rhythm.     Pulmonary/Chest: Effort normal. No respiratory  distress.        Abdominal: Soft. Bowel sounds are normal. She exhibits distension (mild) and abdominal incision (dressing in place, c/d/i). There is tenderness (appropriate). There is no rebound and no guarding.             Musculoskeletal: Moves all extremeties. She exhibits no edema.   TEDs/SCDs in place       Neurological: She is alert and oriented to person, place, and time.    Skin: Skin is warm and dry.    Psychiatric: She has a normal mood and affect. Her behavior is normal.

## 2018-01-21 NOTE — ASSESSMENT & PLAN NOTE
- indication for surgery  - needs [facility] until negative

## 2018-01-21 NOTE — HPI
31 yo  admitted for management of ruptured cornual ectopic. She was unstable on admission, taken immediately to surgery. See op note for details.

## 2018-01-21 NOTE — PROGRESS NOTES
[facility]  Obstetrics & Gynecology  Progress Note    Patient Name: Britta Keen  MRN: [id number]  Admission Date: 2018  Primary Care Provider: Primary Doctor No  Principal Problem: Ruptured ectopic pregnancy    Subjective:     HPI:  33 yo  admitted for management of ruptured cornual ectopic. She was unstable on admission, taken immediately to surgery. See op note for details.    Interval History: POD#1. No acute issues. Pain is adequately controlled with PO medications and NSAIDs. Tolerating regular diet without nausea or vomiting. Has ambulated and voided. Denies flatus or BM. No other complaints at this time.      Scheduled Meds:  Continuous Infusions:   lactated ringers 125 mL/hr at 18 0632     PRN Meds:bisacodyl, diphenhydrAMINE, diphenhydrAMINE, ondansetron, ondansetron, oxyCODONE-acetaminophen, oxyCODONE-acetaminophen, promethazine (PHENERGAN) IVPB, simethicone, sodium chloride 0.9%    Review of patient's allergies indicates:  No Known Allergies    Objective:     Vital Signs (Most Recent):  Temp: 99.3 °F (37.4 °C) (18)  Pulse: 108 (18)  Resp: 18 (18)  BP: (!) 150/69 (18)  SpO2: 98 % (18) Vital Signs (24h Range):  Temp:  [98.3 °F (36.8 °C)-99.4 °F (37.4 °C)] 99.3 °F (37.4 °C)  Pulse:  [] 108  Resp:  [16-18] 18  SpO2:  [98 %-100 %] 98 %  BP: (114-150)/(55-71) 150/69     Weight: 74.4 kg (164 lb)  Body mass index is 24.22 kg/m².  No LMP recorded (lmp unknown). Patient is not currently having periods (Reason: Other).    I&O (Last 24H):  Intake/Output - Last 3 Shifts       700 -  0659 700 -  06 07 -  0659    P.O.  600 480    I.V. (mL/kg)  3360.4 (45.2) 781.3 (10.5)    Blood  1020.8     Other   0    Total Intake(mL/kg)  4981.3 (67) 1261.3 (17)    Urine (mL/kg/hr)  3650 900 (1.3)    Blood  1700     Total Output   5350 900    Net   -368.8 +361.3               Laboratory:    Recent  Labs  Lab 01/20/18  0216 01/20/18  0959 01/20/18  1734   WBC 17.70* 29.84* 22.07*   HGB 11.1* 6.1* 9.4*   HCT 33.7* 18.0* 26.8*   MCV 95 95 84    296 110*      Physical Exam:   Constitutional: She is oriented to person, place, and time. She appears well-developed and well-nourished. No distress.       Cardiovascular: Normal rate and regular rhythm.     Pulmonary/Chest: Effort normal. No respiratory distress.        Abdominal: Soft. Bowel sounds are normal. She exhibits distension (mild) and abdominal incision (dressing in place, c/d/i). There is tenderness (appropriate). There is no rebound and no guarding.             Musculoskeletal: Moves all extremeties. She exhibits no edema.   TEDs/SCDs in place       Neurological: She is alert and oriented to person, place, and time.    Skin: Skin is warm and dry.    Psychiatric: She has a normal mood and affect. Her behavior is normal.       Assessment/Plan:     * Ruptured ectopic pregnancy    - indication for surgery  - [place] [facility] [place]        S/P laparotomy    Postoperative care  - Pain control with percocet prn  - Regular diet  - Zofran/phenergan prn  - Voiding spontaneosuly  - Simethicone prn.  - Encourage ambulation and IS use  - TEDs/SCDs for DVT ppx  - CBC 6/18 >2u pRBCs perioperatively>9/27. Asymptomatic, VSS.            Nina Estes MD  Obstetrics & Gynecology  [facility]

## 2018-01-21 NOTE — DISCHARGE SUMMARY
[facility]  Obstetrics & Gynecology  Discharge Summary    Patient Name: Britta Keen  MRN: [id number]  Admission Date: 2018  Hospital Length of Stay: 1 days  Discharge Date and Time:  2018 4:24 PM  Attending Physician: Max Mcdonald MD   Discharging Provider: Nina Estes MD  Primary Care Provider: Primary Doctor No    HPI:  33 yo  admitted for management of ruptured cornual ectopic. She was unstable on admission, taken immediately to surgery. See op note for details.    Hospital Course:  Patient received 2u pRBCs perioperatively. Postoperative course was uncomplicated, and patient made routine advances as anticipated. On POD#1, patient is meeting all postoperative milestones and is ready for discharge. Pain is well-controlled with PO pain medications. Patient is tolerating PO without nausea or vomiting, ambulating and urinating without difficulty. Patient instructed to continue pain medications, zofran, and simethicone as needed, and to follow up with Dr. Mcdonald in 4-6 weeks for postoperative visit. Also discussed [facility] [place] [place] follow[place] until negative.    Procedure(s) (LRB):  EXPLORATORY-LAPAROTOMY (N/A)  SALPINGECTOMY (Bilateral)         Significant Diagnostic Studies: Labs:   CBC   Recent Labs  Lab 18  0216 18  0959 18  1734   WBC 17.70* 29.84* 22.07*   HGB 11.1* 6.1* 9.4*   HCT 33.7* 18.0* 26.8*    296 110*       Pending Diagnostic Studies:     None        Final Active Diagnoses:    Diagnosis Date Noted POA    PRINCIPAL PROBLEM:  Ruptured ectopic pregnancy [O00.90] 2018 Yes    S/P laparotomy [Z98.890] 2018 Not Applicable      Problems Resolved During this Admission:    Diagnosis Date Noted Date Resolved POA        Discharged Condition: good    Disposition: Home or Self Care    Follow Up:  Follow-up Information     Schedule an appointment as soon as possible for a visit with Max Mcdonald MD.    Specialties:  Obstetrics, Obstetrics and  Gynecology  Why:  follow up visit in 4-6 weeks  Contact information:  Sukhjinder SEGOVIA [address] [address]  [phone]                 Patient Instructions:     hCG, quantitative   Standing Status: Standing Number of Occurrences: 12 Standing Exp. Date: 03/22/19     Activity as tolerated     Notify your health care provider if you experience any of the following:  temperature >100.4     Notify your health care provider if you experience any of the following:  persistent nausea and vomiting or diarrhea     Notify your health care provider if you experience any of the following:  severe uncontrolled pain     Notify your health care provider if you experience any of the following:  redness, tenderness, or signs of infection (pain, swelling, redness, odor or green/yellow discharge around incision site)     Notify your health care provider if you experience any of the following:  difficulty breathing or increased cough     Notify your health care provider if you experience any of the following:  severe persistent headache     Notify your health care provider if you experience any of the following:  persistent dizziness, light-headedness, or visual disturbances     No dressing needed   Order Comments: Wash incision with soap/water daily, dry completely.       Medications:  Reconciled Home Medications:   Current Discharge Medication List      START taking these medications    Details   ibuprofen (ADVIL,MOTRIN) 600 MG tablet Take 1 tablet (600 mg total) by mouth every 6 (six) hours as needed for Pain (cramping).  Qty: 30 tablet, Refills: 1    Associated Diagnoses: S/P laparotomy      ondansetron (ZOFRAN-ODT) 8 MG TbDL Take 1 tablet (8 mg total) by mouth every 8 (eight) hours as needed (nausea, vomiting).  Qty: 20 tablet, Refills: 0    Associated Diagnoses: S/P laparotomy      oxyCODONE-acetaminophen (PERCOCET) 5-325 mg per tablet Take 1 tablet by mouth every 4 (four) hours as needed for Pain.  Qty: 30 tablet,  Refills: 0    Associated Diagnoses: S/P laparotomy      simethicone (MYLICON) 80 MG chewable tablet Take 1 tablet (80 mg total) by mouth 3 (three) times daily as needed for Flatulence.  Refills: 0    Associated Diagnoses: S/P laparotomy             Nina Estes MD  Obstetrics & Gynecology  [facility]

## 2018-01-21 NOTE — ASSESSMENT & PLAN NOTE
Postoperative care  - Pain control with percocet prn  - Regular diet  - Zofran/phenergan prn  - Voiding spontaneosuly  - Simethicone prn.  - Encourage ambulation and IS use  - TEDs/SCDs for DVT ppx  - CBC 6/18 >2u pRBCs perioperatively>9/27. Asymptomatic, VSS.

## 2018-01-21 NOTE — PLAN OF CARE
5:03 PM   In agreement and eager for DC.  Tolerating good PO and ambulating well.  Operative dressing CDI and no vaginal bleeding to peripad.  Voiding with good UOP.     VU of DC instructions; paperwork and prescriptions passed. IVs removed with cath tip intact, WNL.      To be DCd home with family.  Will be escorted downstairs via  transport team once dressed and ready.  Free from falls or skin breakdown this hospital admission.

## 2018-01-21 NOTE — OP NOTE
DATE OF PROCEDURE:  01/20/2018    PREOPERATIVE DIAGNOSIS:  Ruptured ectopic pregnancy.    POSTOPERATIVE DIAGNOSIS:  Ruptured ectopic pregnancy.    PROCEDURES PERFORMED:  1.  Exploratory laparotomy.  2.  Wedge resection of the right cornual area.  3.  Bilateral salpingectomy.    PRIMARY SURGEON:  Max Mcdonald M.D.    PRIMARY ASSISTANT:  Dr. Radha Israel (No residents were available).    ANESTHESIA:  General endotracheal anesthesia.    ESTIMATED BLOOD LOSS:  Approximately 50 mL Intraoperatively, 1700 mL   hemoperitoneum, 400 mL of clots.    FINDINGS:  The patient had nearly two liters of blood and clots in her abdomen   with a ruptured right cornual ectopic.  The patient had otherwise   normal-appearing uterus and ovaries.    STATEMENT OF MEDICAL NECESSITY:  The patient is a 32-year-old female who had a   history of a tubal ligation in 2011, who presented with acute onset of abdominal   pain and obstipation.  The patient was found to have a ruptured ectopic   pregnancy on ultrasound with Beta of 88,000.  After discussing the risks,   benefits, indications and alternatives, the plan was to proceed with the   above-stated procedure.    PROCEDURE IN DETAIL:  After informed consent was obtained, the patient was taken   to the Operating Room, at which time general anesthesia was administered.  The   patient was prepped and draped in the usual sterile fashion.  A 7 cm skin   incision was made approximately 2 fingerbreadths above the pubic symphysis.  The   skin incision was carried down to the fascia and nicked in the midline.  The   incision was extended laterally bilaterally.  Two [facility] clamps were used to grasp the fascia, which were dissected off the   underlying rectus muscles.  The peritoneum was identified and grasped with 2   hemostats.  Metzenbaums were used to sharply entered the peritoneal cavity.    Upon entry copious amounts of blood and clots were removed.  Next, the Florencio   retractor was placed into the abdominal  cavity.  The uterus was grasped and   delivered through the incision.  It was ruptured right cornual ectopic was   identified.  A wedge resection was performed using the Bovie cautery.  The   cornual defect was repaired in a 2-layer fashion using a 0 Vicryl suture.    Attention was taken to the fallopian tubes, which were both removed and suture   ligated using a 0 Vicryl suture.  Copious amount of irrigation was used to clear   all clots and debris.  Multiple attempts were made to try to obtain any   products of conception.  The products were never visualized.  Amy was placed   on the cornual incision.  The Florencio retractor was removed.  A figure-of-eight   suture was placed in the rectus muscles to close the diastasis.  The fascia was   then closed in a running nonlocking fashion using a PDS suture.  The   subcutaneous tissues were made hemostatic using Bovie cautery.  The skin was   closed using a 4-0 Monocryl suture.  The patient was then extubated and taken to   Recovery Room in stable condition.  All laps, needles, and sponge counts were   correct x2 at the conclusion of the case.  The patient did receive preoperative   antibiotics.      ROBERT/IN  dd: 01/20/2018 13:51:09 (CST)  td: 01/20/2018 21:04:27 (CST)  Doc ID   #[id number]  Job ID #[id number]    CC:

## 2018-01-24 LAB
BLD PROD TYP BPU: NORMAL
BLOOD UNIT EXPIRATION DATE: NORMAL
BLOOD UNIT TYPE CODE: 6200
BLOOD UNIT TYPE: NORMAL
CODING SYSTEM: NORMAL
DISPENSE STATUS: NORMAL
TRANS ERYTHROCYTES VOL PATIENT: NORMAL ML

## 2018-01-25 ENCOUNTER — TELEPHONE (OUTPATIENT)
Dept: OBSTETRICS AND GYNECOLOGY | Facility: CLINIC | Age: 33
End: 2018-01-25

## 2018-01-25 DIAGNOSIS — O00.90 RUPTURED ECTOPIC PREGNANCY: Primary | ICD-10-CM

## 2018-01-25 NOTE — TELEPHONE ENCOUNTER
Pt needs weekly bhcg due to ruptured ectopic pregnancy (can be on Friday).  Please call to coordinate.  thanks

## 2018-01-26 ENCOUNTER — LAB VISIT (OUTPATIENT)
Dept: LAB | Facility: OTHER | Age: 33
End: 2018-01-26
Attending: OBSTETRICS & GYNECOLOGY
Payer: MEDICAID

## 2018-01-26 DIAGNOSIS — O00.90 RUPTURED ECTOPIC PREGNANCY: ICD-10-CM

## 2018-01-26 LAB — HCG INTACT+B SERPL-ACNC: 893 MIU/ML

## 2018-01-26 PROCEDURE — 84702 CHORIONIC GONADOTROPIN TEST: CPT

## 2018-01-26 PROCEDURE — 36415 COLL VENOUS BLD VENIPUNCTURE: CPT

## 2018-02-01 ENCOUNTER — TELEPHONE (OUTPATIENT)
Dept: OBSTETRICS AND GYNECOLOGY | Facility: CLINIC | Age: 33
End: 2018-02-01

## 2018-02-01 DIAGNOSIS — O00.90 RUPTURED ECTOPIC PREGNANCY: Primary | ICD-10-CM

## 2018-02-01 DIAGNOSIS — Z98.890 S/P LAPAROTOMY: ICD-10-CM

## 2018-02-01 NOTE — TELEPHONE ENCOUNTER
Patient called to discuss hCG level after ruptured ectopic pregnancy.   1/20: b-hCG 56107  1/26: b-hCG 893  Patient knew she was to have repeat lab tomorrow and says she will be able to do so. Will call her with result.   She is feeling well and has no complaints.     Tony Packer M.D.  Obstetrics & Gynecology  PGY-1

## 2018-02-02 ENCOUNTER — LAB VISIT (OUTPATIENT)
Dept: LAB | Facility: OTHER | Age: 33
End: 2018-02-02
Attending: OBSTETRICS & GYNECOLOGY
Payer: MEDICAID

## 2018-02-02 DIAGNOSIS — O00.90 RUPTURED ECTOPIC PREGNANCY: ICD-10-CM

## 2018-02-02 LAB — HCG INTACT+B SERPL-ACNC: 88 MIU/ML

## 2018-02-02 PROCEDURE — 84702 CHORIONIC GONADOTROPIN TEST: CPT

## 2018-02-02 PROCEDURE — 36415 COLL VENOUS BLD VENIPUNCTURE: CPT

## 2018-02-08 ENCOUNTER — TELEPHONE (OUTPATIENT)
Dept: OBSTETRICS AND GYNECOLOGY | Facility: HOSPITAL | Age: 33
End: 2018-02-08

## 2018-02-08 NOTE — TELEPHONE ENCOUNTER
Britta Freedman contacted to discuss her most recent beta-hCG level.   On 2/2/18 beta-hCG was 88, which is decreased from the previous value of 893.  Discussed with patient the importance of following this pregnancy hormone until it is <5 with weekly lab draws.   She voiced understanding and says [facility]  Advised that I will call with lab value.    Tony Packer M.D.  Obstetrics & Gynecology  PGY-1

## 2018-02-09 ENCOUNTER — LAB VISIT (OUTPATIENT)
Dept: LAB | Facility: OTHER | Age: 33
End: 2018-02-09
Attending: OBSTETRICS & GYNECOLOGY
Payer: MEDICAID

## 2018-02-09 DIAGNOSIS — O00.90 RUPTURED ECTOPIC PREGNANCY: ICD-10-CM

## 2018-02-09 LAB — HCG INTACT+B SERPL-ACNC: 23 MIU/ML

## 2018-02-09 PROCEDURE — 36415 COLL VENOUS BLD VENIPUNCTURE: CPT

## 2018-02-09 PROCEDURE — 84702 CHORIONIC GONADOTROPIN TEST: CPT

## 2018-02-19 ENCOUNTER — TELEPHONE (OUTPATIENT)
Dept: OBSTETRICS AND GYNECOLOGY | Facility: CLINIC | Age: 33
End: 2018-02-19

## 2018-02-19 NOTE — TELEPHONE ENCOUNTER
----- Message from Albaro Scott sent at 2/19/2018 11:47 AM CST -----  Pt needs a follow up with Dr Mcdonald. Pt can be reached at [phone].

## 2018-03-05 ENCOUNTER — OFFICE VISIT (OUTPATIENT)
Dept: OBSTETRICS AND GYNECOLOGY | Facility: CLINIC | Age: 33
End: 2018-03-05
Payer: MEDICAID

## 2018-03-05 VITALS
DIASTOLIC BLOOD PRESSURE: 76 MMHG | BODY MASS INDEX: 22.73 KG/M2 | HEIGHT: 69 IN | WEIGHT: 153.44 LBS | SYSTOLIC BLOOD PRESSURE: 124 MMHG

## 2018-03-05 DIAGNOSIS — Z09 POSTOPERATIVE EXAMINATION: Primary | ICD-10-CM

## 2018-03-05 PROBLEM — O00.90 RUPTURED ECTOPIC PREGNANCY: Status: RESOLVED | Noted: 2018-01-20 | Resolved: 2018-03-05

## 2018-03-05 PROBLEM — Z98.890 S/P LAPAROTOMY: Status: RESOLVED | Noted: 2018-01-21 | Resolved: 2018-03-05

## 2018-03-05 PROCEDURE — 99999 PR PBB SHADOW E&M-EST. PATIENT-LVL II: CPT | Mod: PBBFAC,,, | Performed by: OBSTETRICS & GYNECOLOGY

## 2018-03-05 PROCEDURE — 99212 OFFICE O/P EST SF 10 MIN: CPT | Mod: PBBFAC | Performed by: OBSTETRICS & GYNECOLOGY

## 2018-03-05 PROCEDURE — 99024 POSTOP FOLLOW-UP VISIT: CPT | Mod: ,,, | Performed by: OBSTETRICS & GYNECOLOGY

## 2018-03-05 NOTE — PROGRESS NOTES
"CC: Postop visit    Britta Freedman is a 32 y.o. female  post-op from a x lap, bilateral salpingectomy, resection of cornual ectopic on .  Patient is doing well postoperatively.  No pain.  No bowel or bladder complaints.  No fever.      The pathology revealed:  Ectopic preg.    Past Medical History:   Diagnosis Date    Arthritis     Scoliosis      Past Surgical History:   Procedure Laterality Date    TUBAL LIGATION Bilateral      Family History   Problem Relation Age of Onset    Breast cancer Neg Hx     Colon cancer Neg Hx     Ovarian cancer Neg Hx      Social History   Substance Use Topics    Smoking status: Never Smoker    Smokeless tobacco: Never Used    Alcohol use No     OB History    Para Term  AB Living   3 2 0   0 2   SAB TAB Ectopic Multiple Live Births                  # Outcome Date GA Lbr Ck/2nd Weight Sex Delivery Anes PTL Lv   3             2 Para            1 Para                   /76   Ht 5' 9" (1.753 m)   Wt 69.6 kg (153 lb 7 oz)   LMP 2018 (Exact Date)   BMI 22.66 kg/m²     ROS:  GENERAL: No fever, chills, fatigability or weight loss.  VULVAR: No pain, no lesions and no itching.  VAGINAL: No relaxation, no itching, no discharge, no abnormal bleeding and no lesions.  ABDOMEN: No abdominal pain. Denies nausea. Denies vomiting. No diarrhea. No constipation  BREAST: Denies pain. No lumps. No discharge.  URINARY: No incontinence, no nocturia, no frequency and no dysuria.  CARDIOVASCULAR: No chest pain. No shortness of breath. No leg cramps.  NEUROLOGICAL: No headaches. No vision changes.    PE:   ABDOMEN:  Soft, non-tender, non-distended.  PELVIC: Normal external genitalia without lesions.  Normal hair distribution.  Adequate perineal body, normal urethral meatus.  Vagina moist and well rugated without lesions or discharge.  Cervix pink, without lesions, discharge or tenderness.  No significant cystocele or rectocele.  Bimanual exam shows " uterus to be normal size, regular, mobile and nontender.  Adnexa without masses or tenderness.        ICD-10-CM ICD-9-CM    1. Postoperative examination Z09 V67.00          No Follow-up on file.

## 2018-03-16 ENCOUNTER — TELEPHONE (OUTPATIENT)
Dept: GYNECOLOGY | Facility: OTHER | Age: 33
End: 2018-03-16

## 2018-03-16 ENCOUNTER — LAB VISIT (OUTPATIENT)
Dept: LAB | Facility: OTHER | Age: 33
End: 2018-03-16
Payer: MEDICAID

## 2018-03-16 DIAGNOSIS — O02.1 MISSED ABORTION: Primary | ICD-10-CM

## 2018-03-16 DIAGNOSIS — O02.1 MISSED ABORTION: ICD-10-CM

## 2018-03-16 LAB — HCG INTACT+B SERPL-ACNC: <1.2 MIU/ML

## 2018-03-16 PROCEDURE — 36415 COLL VENOUS BLD VENIPUNCTURE: CPT

## 2018-03-16 PROCEDURE — 84702 CHORIONIC GONADOTROPIN TEST: CPT

## 2018-03-16 NOTE — TELEPHONE ENCOUNTER
Patient reached via provided phone number to follow-up on her B-HCG Level.  On 2/2 her B-HCG was 88 and then dropped to 23 on 2/9.  I recommended that she return to have another level drawn until the level returns below 5.  The patient expressed understanding and states she would come at her first convenience.  All questions answered.    Tigist Gloria M.D.  PGY1 OB/GYN

## 2018-03-19 ENCOUNTER — TELEPHONE (OUTPATIENT)
Dept: OBSTETRICS AND GYNECOLOGY | Facility: CLINIC | Age: 33
End: 2018-03-19

## 2018-03-19 NOTE — TELEPHONE ENCOUNTER
----- Message from Max Mcdonald MD sent at 3/16/2018  7:16 PM CDT -----  Negative. No [facility] [place] required

## 2021-02-01 NOTE — PLAN OF CARE
Problem: Patient Care Overview  Goal: Plan of Care Review  Outcome: Ongoing (interventions implemented as appropriate)  Pt free of trauma, falls, and injury. Pt VSS and afebrile throughout shift. Pt free of skin breakdown. Pt dressing is clean, dry, and intact on lower abdomen. Pt pain has been moderately controlled by IV pain meds and tolerated well. Pt has been sleeping since arrival to unit; voiding adequately via yanez catheter   throughout shift. Pt has call light in reach, bed alarm on, bed brakes on, side rails up x2, bed in low position, IS at bedside, and nonskid socks on. Pt lying in bed in no distress with family at bedside. Will continue to monitor.        Isotretinoin Counseling: Patient should get monthly blood tests, not donate blood, not drive at night if vision affected, not share medication, and not undergo elective surgery for 6 months after tx completed. Side effects reviewed, pt to contact office should one occur.

## 2021-04-26 ENCOUNTER — PATIENT MESSAGE (OUTPATIENT)
Dept: RESEARCH | Facility: HOSPITAL | Age: 36
End: 2021-04-26

## (undated) DEVICE — CLIPPER BLADE MOD 4406 (CAREF)

## (undated) DEVICE — SUT PDS II 0 CT VIL MONO 36

## (undated) DEVICE — TRAY DRY SKIN SCRUB PREP

## (undated) DEVICE — GAUZE SPONGE 4X4 12PLY

## (undated) DEVICE — JELLY KY LUBRICATING 5G PACKET

## (undated) DEVICE — SPONGE LAP 18X18 PREWASHED

## (undated) DEVICE — ADHESIVE MASTISOL VIAL 48/BX

## (undated) DEVICE — CLOSURE SKIN STERI STRIP 1/2X4

## (undated) DEVICE — SUT 0 8-27IN VICRYL PL CT-1

## (undated) DEVICE — POWDER ARISTA AH 3G

## (undated) DEVICE — SEE MEDLINE ITEM 153151

## (undated) DEVICE — GLOVE BIOGEL SKINSENSE PI 6.5

## (undated) DEVICE — SUT MCRYL PLUS 4-0 PS2 27IN

## (undated) DEVICE — GLOVE BIOGEL SKINSENSE PI 7.0

## (undated) DEVICE — DRAPE LAP TIBURON 77X122IN

## (undated) DEVICE — Device

## (undated) DEVICE — KIT URINE METER 350ML STAT LOC

## (undated) DEVICE — TAPE MEDIPORE 4IN X 2YDS

## (undated) DEVICE — SEE MEDLINE ITEM 152622

## (undated) DEVICE — SOL PVP-I SCRUB 7.5% 4OZ

## (undated) DEVICE — ELECTRODE REM PLYHSV RETURN 9

## (undated) DEVICE — SEE MEDLINE ITEM 157110